# Patient Record
Sex: FEMALE | Race: WHITE | NOT HISPANIC OR LATINO | Employment: FULL TIME | URBAN - METROPOLITAN AREA
[De-identification: names, ages, dates, MRNs, and addresses within clinical notes are randomized per-mention and may not be internally consistent; named-entity substitution may affect disease eponyms.]

---

## 2017-07-07 ENCOUNTER — COMMUNICATION - HEALTHEAST (OUTPATIENT)
Dept: FAMILY MEDICINE | Facility: CLINIC | Age: 47
End: 2017-07-07

## 2017-07-07 DIAGNOSIS — E03.9 HYPOTHYROIDISM: ICD-10-CM

## 2017-09-18 ENCOUNTER — RECORDS - HEALTHEAST (OUTPATIENT)
Dept: ADMINISTRATIVE | Facility: OTHER | Age: 47
End: 2017-09-18

## 2017-09-18 ENCOUNTER — OFFICE VISIT - HEALTHEAST (OUTPATIENT)
Dept: FAMILY MEDICINE | Facility: CLINIC | Age: 47
End: 2017-09-18

## 2017-09-18 DIAGNOSIS — Z23 NEED FOR PROPHYLACTIC VACCINATION AND INOCULATION AGAINST VIRAL HEPATITIS: ICD-10-CM

## 2017-09-18 DIAGNOSIS — K21.9 GERD WITHOUT ESOPHAGITIS: ICD-10-CM

## 2017-09-18 DIAGNOSIS — Z12.4 SCREENING FOR CERVICAL CANCER: ICD-10-CM

## 2017-09-18 DIAGNOSIS — E55.9 VITAMIN D INSUFFICIENCY: ICD-10-CM

## 2017-09-18 DIAGNOSIS — Z00.00 WELL ADULT EXAM: ICD-10-CM

## 2017-09-18 DIAGNOSIS — E03.9 HYPOTHYROIDISM: ICD-10-CM

## 2017-09-18 DIAGNOSIS — Z13.1 SCREENING FOR DIABETES MELLITUS: ICD-10-CM

## 2017-09-18 LAB
CHOLEST SERPL-MCNC: 172 MG/DL
FASTING STATUS PATIENT QL REPORTED: YES
HDLC SERPL-MCNC: 44 MG/DL
LDLC SERPL CALC-MCNC: 98 MG/DL
TRIGL SERPL-MCNC: 150 MG/DL

## 2017-09-18 ASSESSMENT — MIFFLIN-ST. JEOR: SCORE: 1673.6

## 2017-09-19 ENCOUNTER — COMMUNICATION - HEALTHEAST (OUTPATIENT)
Dept: FAMILY MEDICINE | Facility: CLINIC | Age: 47
End: 2017-09-19

## 2017-09-21 LAB
HPV INTERPRETATION - HISTORICAL: NORMAL
HPV INTERPRETER - HISTORICAL: NORMAL

## 2017-10-03 ENCOUNTER — COMMUNICATION - HEALTHEAST (OUTPATIENT)
Dept: FAMILY MEDICINE | Facility: CLINIC | Age: 47
End: 2017-10-03

## 2017-10-03 DIAGNOSIS — E55.9 VITAMIN D INSUFFICIENCY: ICD-10-CM

## 2018-03-15 ENCOUNTER — AMBULATORY - HEALTHEAST (OUTPATIENT)
Dept: FAMILY MEDICINE | Facility: CLINIC | Age: 48
End: 2018-03-15

## 2018-03-19 ENCOUNTER — AMBULATORY - HEALTHEAST (OUTPATIENT)
Dept: NURSING | Facility: CLINIC | Age: 48
End: 2018-03-19

## 2018-12-07 ENCOUNTER — AMBULATORY - HEALTHEAST (OUTPATIENT)
Dept: FAMILY MEDICINE | Facility: CLINIC | Age: 48
End: 2018-12-07

## 2018-12-07 ENCOUNTER — OFFICE VISIT - HEALTHEAST (OUTPATIENT)
Dept: FAMILY MEDICINE | Facility: CLINIC | Age: 48
End: 2018-12-07

## 2018-12-07 ENCOUNTER — RECORDS - HEALTHEAST (OUTPATIENT)
Dept: ADMINISTRATIVE | Facility: OTHER | Age: 48
End: 2018-12-07

## 2018-12-07 DIAGNOSIS — Z83.3 FAMILY HISTORY OF DIABETES MELLITUS: ICD-10-CM

## 2018-12-07 DIAGNOSIS — Z00.00 ANNUAL PHYSICAL EXAM: ICD-10-CM

## 2018-12-07 DIAGNOSIS — E03.9 HYPOTHYROIDISM: ICD-10-CM

## 2018-12-07 DIAGNOSIS — I10 ESSENTIAL HYPERTENSION, BENIGN: ICD-10-CM

## 2018-12-07 LAB
ANION GAP SERPL CALCULATED.3IONS-SCNC: 13 MMOL/L (ref 5–18)
BUN SERPL-MCNC: 22 MG/DL (ref 8–22)
CALCIUM SERPL-MCNC: 9.9 MG/DL (ref 8.5–10.5)
CHLORIDE BLD-SCNC: 103 MMOL/L (ref 98–107)
CHOLEST SERPL-MCNC: 174 MG/DL
CO2 SERPL-SCNC: 25 MMOL/L (ref 22–31)
CREAT SERPL-MCNC: 0.79 MG/DL (ref 0.6–1.1)
FASTING STATUS PATIENT QL REPORTED: YES
GFR SERPL CREATININE-BSD FRML MDRD: >60 ML/MIN/1.73M2
GLUCOSE BLD-MCNC: 105 MG/DL (ref 70–125)
HBA1C MFR BLD: 5.9 % (ref 3.5–6)
HDLC SERPL-MCNC: 44 MG/DL
HGB BLD-MCNC: 13.2 G/DL (ref 12–16)
LDLC SERPL CALC-MCNC: 103 MG/DL
POTASSIUM BLD-SCNC: 4.1 MMOL/L (ref 3.5–5)
SODIUM SERPL-SCNC: 141 MMOL/L (ref 136–145)
TRIGL SERPL-MCNC: 137 MG/DL
TSH SERPL DL<=0.005 MIU/L-ACNC: 2.41 UIU/ML (ref 0.3–5)

## 2018-12-07 ASSESSMENT — MIFFLIN-ST. JEOR: SCORE: 1671.9

## 2018-12-10 LAB — 25(OH)D3 SERPL-MCNC: 22.2 NG/ML (ref 30–80)

## 2018-12-11 ENCOUNTER — COMMUNICATION - HEALTHEAST (OUTPATIENT)
Dept: FAMILY MEDICINE | Facility: CLINIC | Age: 48
End: 2018-12-11

## 2018-12-21 ENCOUNTER — AMBULATORY - HEALTHEAST (OUTPATIENT)
Dept: FAMILY MEDICINE | Facility: CLINIC | Age: 48
End: 2018-12-21

## 2018-12-21 ENCOUNTER — AMBULATORY - HEALTHEAST (OUTPATIENT)
Dept: NURSING | Facility: CLINIC | Age: 48
End: 2018-12-21

## 2018-12-21 DIAGNOSIS — I10 ESSENTIAL HYPERTENSION, BENIGN: ICD-10-CM

## 2018-12-21 DIAGNOSIS — E55.9 VITAMIN D INSUFFICIENCY: ICD-10-CM

## 2018-12-24 ENCOUNTER — COMMUNICATION - HEALTHEAST (OUTPATIENT)
Dept: FAMILY MEDICINE | Facility: CLINIC | Age: 48
End: 2018-12-24

## 2019-07-05 ENCOUNTER — OFFICE VISIT - HEALTHEAST (OUTPATIENT)
Dept: FAMILY MEDICINE | Facility: CLINIC | Age: 49
End: 2019-07-05

## 2019-07-05 DIAGNOSIS — E03.9 HYPOTHYROIDISM: ICD-10-CM

## 2019-07-05 DIAGNOSIS — I10 ESSENTIAL HYPERTENSION, BENIGN: ICD-10-CM

## 2019-07-05 DIAGNOSIS — L82.0 INFLAMED SEBORRHEIC KERATOSIS: ICD-10-CM

## 2019-07-05 LAB
ANION GAP SERPL CALCULATED.3IONS-SCNC: 8 MMOL/L (ref 5–18)
BUN SERPL-MCNC: 17 MG/DL (ref 8–22)
CALCIUM SERPL-MCNC: 9.7 MG/DL (ref 8.5–10.5)
CHLORIDE BLD-SCNC: 107 MMOL/L (ref 98–107)
CO2 SERPL-SCNC: 27 MMOL/L (ref 22–31)
CREAT SERPL-MCNC: 0.88 MG/DL (ref 0.6–1.1)
GFR SERPL CREATININE-BSD FRML MDRD: >60 ML/MIN/1.73M2
GLUCOSE BLD-MCNC: 101 MG/DL (ref 70–125)
POTASSIUM BLD-SCNC: 4.4 MMOL/L (ref 3.5–5)
SODIUM SERPL-SCNC: 142 MMOL/L (ref 136–145)

## 2019-07-06 ENCOUNTER — COMMUNICATION - HEALTHEAST (OUTPATIENT)
Dept: FAMILY MEDICINE | Facility: CLINIC | Age: 49
End: 2019-07-06

## 2019-09-19 ENCOUNTER — RECORDS - HEALTHEAST (OUTPATIENT)
Dept: ADMINISTRATIVE | Facility: OTHER | Age: 49
End: 2019-09-19

## 2020-03-09 ENCOUNTER — COMMUNICATION - HEALTHEAST (OUTPATIENT)
Dept: FAMILY MEDICINE | Facility: CLINIC | Age: 50
End: 2020-03-09

## 2020-03-09 DIAGNOSIS — L83 ACQUIRED ACANTHOSIS NIGRICANS: ICD-10-CM

## 2020-06-30 ENCOUNTER — COMMUNICATION - HEALTHEAST (OUTPATIENT)
Dept: FAMILY MEDICINE | Facility: CLINIC | Age: 50
End: 2020-06-30

## 2020-06-30 DIAGNOSIS — E03.9 HYPOTHYROIDISM: ICD-10-CM

## 2020-06-30 DIAGNOSIS — I10 ESSENTIAL HYPERTENSION, BENIGN: ICD-10-CM

## 2020-09-25 ENCOUNTER — OFFICE VISIT - HEALTHEAST (OUTPATIENT)
Dept: FAMILY MEDICINE | Facility: CLINIC | Age: 50
End: 2020-09-25

## 2020-09-25 ENCOUNTER — RECORDS - HEALTHEAST (OUTPATIENT)
Dept: ADMINISTRATIVE | Facility: OTHER | Age: 50
End: 2020-09-25

## 2020-09-25 DIAGNOSIS — R73.03 PREDIABETES: ICD-10-CM

## 2020-09-25 DIAGNOSIS — I10 ESSENTIAL HYPERTENSION, BENIGN: ICD-10-CM

## 2020-09-25 DIAGNOSIS — Z76.89 ESTABLISHING CARE WITH NEW DOCTOR, ENCOUNTER FOR: ICD-10-CM

## 2020-09-25 DIAGNOSIS — Z12.31 VISIT FOR SCREENING MAMMOGRAM: ICD-10-CM

## 2020-09-25 DIAGNOSIS — Z12.11 COLON CANCER SCREENING: ICD-10-CM

## 2020-09-25 DIAGNOSIS — E03.9 HYPOTHYROIDISM, UNSPECIFIED TYPE: ICD-10-CM

## 2020-09-25 DIAGNOSIS — Z23 NEED FOR VACCINATION: ICD-10-CM

## 2020-09-25 LAB
ANION GAP SERPL CALCULATED.3IONS-SCNC: 8 MMOL/L (ref 5–18)
BUN SERPL-MCNC: 25 MG/DL (ref 8–22)
CALCIUM SERPL-MCNC: 10.1 MG/DL (ref 8.5–10.5)
CHLORIDE BLD-SCNC: 106 MMOL/L (ref 98–107)
CHOLEST SERPL-MCNC: 186 MG/DL
CO2 SERPL-SCNC: 27 MMOL/L (ref 22–31)
CREAT SERPL-MCNC: 0.81 MG/DL (ref 0.6–1.1)
FASTING STATUS PATIENT QL REPORTED: YES
GFR SERPL CREATININE-BSD FRML MDRD: >60 ML/MIN/1.73M2
GLUCOSE BLD-MCNC: 120 MG/DL (ref 70–125)
HBA1C MFR BLD: 5.9 %
HDLC SERPL-MCNC: 46 MG/DL
LDLC SERPL CALC-MCNC: 114 MG/DL
POTASSIUM BLD-SCNC: 4.2 MMOL/L (ref 3.5–5)
SODIUM SERPL-SCNC: 141 MMOL/L (ref 136–145)
TRIGL SERPL-MCNC: 130 MG/DL
TSH SERPL DL<=0.005 MIU/L-ACNC: 2.02 UIU/ML (ref 0.3–5)

## 2020-09-25 ASSESSMENT — MIFFLIN-ST. JEOR: SCORE: 1690.05

## 2020-09-28 ENCOUNTER — COMMUNICATION - HEALTHEAST (OUTPATIENT)
Dept: FAMILY MEDICINE | Facility: CLINIC | Age: 50
End: 2020-09-28

## 2020-10-06 ENCOUNTER — COMMUNICATION - HEALTHEAST (OUTPATIENT)
Dept: FAMILY MEDICINE | Facility: CLINIC | Age: 50
End: 2020-10-06

## 2020-10-06 DIAGNOSIS — I10 ESSENTIAL HYPERTENSION, BENIGN: ICD-10-CM

## 2020-10-06 DIAGNOSIS — E03.9 HYPOTHYROIDISM: ICD-10-CM

## 2020-10-09 RX ORDER — LOSARTAN POTASSIUM 50 MG/1
TABLET ORAL
Qty: 90 TABLET | Refills: 3 | Status: SHIPPED | OUTPATIENT
Start: 2020-10-09 | End: 2021-12-31

## 2020-10-09 RX ORDER — LEVOTHYROXINE SODIUM 112 UG/1
TABLET ORAL
Qty: 90 TABLET | Refills: 3 | Status: SHIPPED | OUTPATIENT
Start: 2020-10-09 | End: 2021-12-31

## 2021-05-29 ENCOUNTER — RECORDS - HEALTHEAST (OUTPATIENT)
Dept: ADMINISTRATIVE | Facility: CLINIC | Age: 51
End: 2021-05-29

## 2021-05-30 NOTE — PROGRESS NOTES
ASSESSMENT/PLAN:       1. Hypothyroidism      - levothyroxine (SYNTHROID, LEVOTHROID) 112 MCG tablet; Take 1 tablet (112 mcg total) by mouth Daily at 6:00 am.  Dispense: 90 tablet; Refill: 3  Patient will return in 6 months for a chronic disease visit at that time will be due to check her TSH    2. Essential hypertension, benign    We will increase the losartan to 50 mg daily and will check her creatinine and electrolytes today.  She like to get a letter with the test results    - losartan (COZAAR) 50 MG tablet; Take 1 tablet (50 mg total) by mouth daily.  Dispense: 90 tablet; Refill: 3  - Basic Metabolic Panel    #3 seborrheic keratosis left ear  Expectations and wound care discussed with the patient.  It is possible that freezing this will not completely get rid of it but I explained to the patient that we need to be careful as to how aggressive we are with treatment because of the underlying cartilage and do not want to cause any deformity to the cartilage.  She is aware that this lesion is benign.        Chester Ravi MD      PROGRESS NOTE   7/5/2019    SUBJECTIVE:  Marcus Fu is a 49 y.o. female  who presents for   Chief Complaint   Patient presents with     Follow-up     BP recheck, lump on left ear     Breast Mass     lump on left breast     1. Hypothyroidism  The patient has had a history of hypothyroidism for for 5 years and is been on a fairly stable dose of levothyroxine.  Her last TSH was December 2018 and the value was normal.  She needs a refill on her levothyroxine    - levothyroxine (SYNTHROID, LEVOTHROID) 112 MCG tablet; Take 1 tablet (112 mcg total) by mouth Daily at 6:00 am.  Dispense: 90 tablet; Refill: 3    2. Essential hypertension, benign  The patient has had a history of hypertension for the last 6 or 7 months and was placed on losartan 25 mg daily in December 2018 and has not had any side effects from that medication.  She really has not been monitoring her blood pressure and she  has not had any follow-up blood work.  The blood pressure is elevated today    #3 seborrheic keratosis left ear  Patient has an irritating lesion on the pinna of the left ear that is been there for about a year and at one point she picked it off but it grew back.  She like to have that removed if possible  Patient Active Problem List   Diagnosis     Obesity     Hypothyroidism     Female Stress Incontinence     Acrochordon     Vitamin D insufficiency     Prediabetes     Essential hypertension, benign       Current Outpatient Medications   Medication Sig Dispense Refill     cholecalciferol, vitamin D3, (VITAMIN D3) 2,000 unit Tab Take 1 tablet (2,000 Units total) by mouth daily. 1 tablet 0     levothyroxine (SYNTHROID, LEVOTHROID) 112 MCG tablet Take 1 tablet (112 mcg total) by mouth Daily at 6:00 am. 90 tablet 3     losartan (COZAAR) 50 MG tablet Take 1 tablet (50 mg total) by mouth daily. 90 tablet 3     omeprazole (PRILOSEC) 20 MG capsule Take 1 capsule (20 mg total) by mouth daily before breakfast. 30 capsule 2     No current facility-administered medications for this visit.        Social History     Tobacco Use   Smoking Status Never Smoker   Smokeless Tobacco Never Used           OBJECTIVE:        Recent Results (from the past 240 hour(s))   Basic Metabolic Panel   Result Value Ref Range    Sodium 142 136 - 145 mmol/L    Potassium 4.4 3.5 - 5.0 mmol/L    Chloride 107 98 - 107 mmol/L    CO2 27 22 - 31 mmol/L    Anion Gap, Calculation 8 5 - 18 mmol/L    Glucose 101 70 - 125 mg/dL    Calcium 9.7 8.5 - 10.5 mg/dL    BUN 17 8 - 22 mg/dL    Creatinine 0.88 0.60 - 1.10 mg/dL    GFR MDRD Af Amer >60 >60 mL/min/1.73m2    GFR MDRD Non Af Amer >60 >60 mL/min/1.73m2       Vitals:    07/05/19 0901 07/05/19 0903   BP: 131/90 125/90   Pulse: 85    SpO2: 98%    Weight: (!) 227 lb 12.8 oz (103.3 kg)      Weight: (!) 227 lb 12.8 oz (103.3 kg)          Physical Exam:  GENERAL APPEARANCE: 49-year-old female, NAD, well hydrated,  well nourished  SKIN:  Normal skin turgor, left pinna there is a 3 mm seborrheic keratosis that is mildly irritated and it was treated with liquid nitrogen x2 with 1/22 freeze each time  HEENT: moist mucous membranes, no rhinorrhea  NECK: Normal without adenopathy or masses  CV: RRR, no M/G/R   LUNGS: CTAB  ABDOMEN: S&NT, no masses or enlarged organs   EXTREMITY: no edema and full ROM of all joints  NEURO: no focal findings

## 2021-05-31 VITALS — BODY MASS INDEX: 34.01 KG/M2 | WEIGHT: 224.44 LBS | HEIGHT: 68 IN

## 2021-06-02 VITALS — WEIGHT: 222.31 LBS | BODY MASS INDEX: 33.69 KG/M2 | HEIGHT: 68 IN

## 2021-06-03 VITALS — WEIGHT: 227.8 LBS | BODY MASS INDEX: 34.64 KG/M2

## 2021-06-05 VITALS
RESPIRATION RATE: 18 BRPM | OXYGEN SATURATION: 97 % | HEIGHT: 68 IN | BODY MASS INDEX: 34.3 KG/M2 | TEMPERATURE: 98.4 F | DIASTOLIC BLOOD PRESSURE: 86 MMHG | SYSTOLIC BLOOD PRESSURE: 132 MMHG | HEART RATE: 107 BPM | WEIGHT: 226.31 LBS

## 2021-06-06 NOTE — TELEPHONE ENCOUNTER
Discussion with Dr. 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  The hyperpigmentation is painful and the patient also has a type of hand dermatitis which can be associated with adenocarcinoma.  I am happy to see the patient for a annual wellness exam.  Tests that are needed: Evaluation for metabolic syndrome.  Screening for adenocarcinoma and lymphoma.  Reasonable would include mammogram, colonoscopy, CBC, sed rate, LDH, hepatic profile, urinalysis, CT of the abdomen and pelvis and also possibly upper endoscopy.  The patient will make an appointment in the near future to have this examination and at that time I will order appropriate testing.   1-2 drinks

## 2021-06-06 NOTE — TELEPHONE ENCOUNTER
Provider Communication  Who is calling:  Dr Gely Sweet  Facility in which provider is associated:  [facility]  Reason for call:  Wants to speak to provider regarding this patients malignancy.  Urgency for return call:  within 2 hours  Okay to leave detailed message?:  Yes

## 2021-06-09 NOTE — TELEPHONE ENCOUNTER
RN cannot approve Refill Request    RN can NOT refill this medication Protocol failed and NO refill given.        Josiane Zaidi, [facility] Triage/Med Refill 7/1/2020    Requested Prescriptions   Pending Prescriptions Disp Refills     levothyroxine (SYNTHROID, LEVOTHROID) 112 MCG tablet [Pharmacy Med Name: LEVOTHYROXINE SOD 112MCG TAB] 90 tablet 3     Sig: TAKE ONE TABLET BY MOUTH EVERY DAY AT 6:00AM       Thyroid Hormones Protocol Failed - 6/30/2020  4:16 AM        Failed - TSH on file in past 12 months for patient age 12 & older     TSH   Date Value Ref Range Status   12/07/2018 2.41 0.30 - 5.00 uIU/mL Final                   Passed - Provider visit in past 12 months or next 3 months     Last office visit with prescriber/PCP: 7/5/2019 Chester Ravi MD OR same dept: 7/5/2019 Chester Ravi MD OR same specialty: 7/5/2019 Chester Ravi MD  Last physical: Visit date not found Last MTM visit: Visit date not found   Next visit within 3 mo: Visit date not found  Next physical within 3 mo: Visit date not found  Prescriber OR PCP: Chester Ravi MD  Last diagnosis associated with med order: 1. Hypothyroidism  - levothyroxine (SYNTHROID, LEVOTHROID) 112 MCG tablet [Pharmacy Med Name: LEVOTHYROXINE SOD 112MCG TAB]; TAKE ONE TABLET BY MOUTH EVERY DAY AT 6:00AM  Dispense: 90 tablet; Refill: 3    2. Essential hypertension, benign  - losartan (COZAAR) 50 MG tablet [Pharmacy Med Name: LOSARTAN POTASSIUM 50MG TABS]; TAKE ONE TABLET BY MOUTH EVERY DAY  Dispense: 90 tablet; Refill: 3    If protocol passes may refill for 12 months if within 3 months of last provider visit (or a total of 15 months).                losartan (COZAAR) 50 MG tablet [Pharmacy Med Name: LOSARTAN POTASSIUM 50MG TABS] 90 tablet 3     Sig: TAKE ONE TABLET BY MOUTH EVERY DAY       Angiotensin Receptor Blocker Protocol Passed - 6/30/2020  4:16 AM        Passed - PCP or prescribing provider visit in past 12 months       Last office visit with  prescriber/PCP: 7/5/2019 Chester Ravi MD OR same dept: 7/5/2019 Chester Ravi MD OR same specialty: 7/5/2019 Chester Ravi MD  Last physical: Visit date not found Last MTM visit: Visit date not found   Next visit within 3 mo: Visit date not found  Next physical within 3 mo: Visit date not found  Prescriber OR PCP: Chester Ravi MD  Last diagnosis associated with med order: 1. Hypothyroidism  - levothyroxine (SYNTHROID, LEVOTHROID) 112 MCG tablet [Pharmacy Med Name: LEVOTHYROXINE SOD 112MCG TAB]; TAKE ONE TABLET BY MOUTH EVERY DAY AT 6:00AM  Dispense: 90 tablet; Refill: 3    2. Essential hypertension, benign  - losartan (COZAAR) 50 MG tablet [Pharmacy Med Name: LOSARTAN POTASSIUM 50MG TABS]; TAKE ONE TABLET BY MOUTH EVERY DAY  Dispense: 90 tablet; Refill: 3    If protocol passes may refill for 12 months if within 3 months of last provider visit (or a total of 15 months).             Passed - Serum potassium within the past 12 months     Lab Results   Component Value Date    Potassium 4.4 07/05/2019             Passed - Blood pressure filed in past 12 months     BP Readings from Last 1 Encounters:   07/05/19 (!) 136/92             Passed - Serum creatinine within the past 12 months     Creatinine   Date Value Ref Range Status   07/05/2019 0.88 0.60 - 1.10 mg/dL Final

## 2021-06-09 NOTE — TELEPHONE ENCOUNTER
Please help the patient get an appointment to see me in the clinic for AWV with care gaps in July.  Only refill times 1.    Chester Ravi MD

## 2021-06-11 NOTE — PROGRESS NOTES
1. Essential hypertension, benign  Basic Metabolic Panel    Lipid Cascade FASTING   2. Hypothyroidism, unspecified type  Thyroid Stimulating Hormone (TSH)   3. Prediabetes  Glycosylated Hemoglobin A1c   4. Visit for screening mammogram  CANCELED: Mammo Screening Bilateral   5. Colon cancer screening  CANCELED: Ambulatory referral for Colonoscopy   6. Need for vaccination     7. Establishing care with new doctor, encounter for           ASSESSMENT/PLAN:     The following high BMI interventions were performed this visit: encouragement to exercise, weight monitoring, exercise promotion: stretching and exercise promotion: walking/step counting    1. Essential hypertension, benign    - Basic Metabolic Panel  - Lipid Cascade FASTING  Stable on current medications, no changes made  The 10-year ASCVD risk score (Geignacia RODRIGES Jr., et al., 2013) is: 2%    Values used to calculate the score:      Age: 50 years      Sex: Female      Is Non- : No      Diabetic: No      Tobacco smoker: No      Systolic Blood Pressure: 132 mmHg      Is BP treated: Yes      HDL Cholesterol: 46 mg/dL      Total Cholesterol: 186 mg/dL      2. Hypothyroidism, unspecified type    - Thyroid Stimulating Hormone (TSH)  Stable per recent lab work    3. Prediabetes    - Glycosylated Hemoglobin A1c  Currently not on medications, A1c 5.9  Continue with exercise, diet modifications and weight loss     4. Visit for screening mammogram    Monthly BSE  Has appointment for her mammogram today at [facility] in [place]    5. Colon cancer screening     Patient would like to hold off for now due to COVID pandemic    6. Need for vaccination    Already received flu vaccine  Discussed Shingrix vaccine series today    7. Establishing care with new doctor, encounter for    Scheduled annual physical in the next 6 months    There are no Patient Instructions on file for this visit.  There are no discontinued medications.  Return in about 6 months (around  "3/25/2021) for annual physical, hypertension. Med check by phone okay, physical when conveinent for patient.    The visit lasted a total of 40 minutes face to face with the patient.  Over 50% of the time spent counseling and educating the patient about medical, family, surgical history, preventative health, med check, vaccines.     Elizabeth Sandhu NP          SUBJECTIVE:  Marcus Fu is a 50 y.o. female presents to [facility] care today and for her medication check.  Medical, family and surgical history reviewed.  Patient is , she has 1 child.  She works full-time doing office work.  She does not drink alcohol, she is a non-smoker and denies illicit drug use.  She is not on any special diet is 9, she does not exercise.  Current BMI 34.4.  Her last menstrual cycle was in March 2020.  No history of abortions or miscarriages.  Last Pap smear completed in 2017 which was negative for ASCUS or HPV.  She is scheduled to have her mammogram completed today.  She would like to hold off on her colon cancer screening and bone scan due to the COVID pandemic.  She has her mammograms completed at [facility] in [place].  She has already received her influenza vaccine this season.  She is due for shingles vaccine and her annual physical.     Hypertension: Stable on current dose of losartan.  She has no history of coronary artery disease or stroke.  Denies any chest pain, shortness of breath, headaches or lower extremity swelling today.  She is not taking a statin at this time and does not take aspirin.  She did complete a stress test at [facility] back in 2013 which was negative along with her EKG.     Hypothyroid: Denies any recent fatigue changes, hair loss, dry skin or constipation.  She is pre-diabetic, last A1c check was 5.9.    GERD: Well-controlled on current Prilosec dose.  She does have a history of chronic IBS but she states \"I just deal with it \".    Additional medical history includes changes in " "pigmentation of the skin, she did see dermatology, these areas were negative for cancer.  She does get some blurry vision with small print, she is overdue for an eye exam, she states \"I am not embracing cheaters \".  She does have a history of seasonal allergies and does experience hoarseness with this.  For the last 2 weeks, she has been dealing with some neck stiffness, she has not noticed any significant change with range of motion.  She has not used any ice or heat, she has been taking Aleve with minimal relief.  She denies any falls or trauma.  She has been dealing with left heel plantar fasciitis pain for the last 6 weeks now.  She has not been seen by podiatry for this.  She  does not need inserts in her shoes or tried icing the heels.  She does have a history of stress incontinence.  She did complete a consult with urology specialty back in September 2019.  She does wear a very thin pad for her incontinence issues.  She is not sexually active because of this issue, she is rather embarrassed by it.  She has a history of having one abnormal mammogram, her follow-up focal ultrasounds were negative for malignancy.  She does not perform breast self exams.  Chief Complaint   Patient presents with     Establish Care         Patient Active Problem List   Diagnosis     Obesity     Hypothyroidism     Female Stress Incontinence     Acrochordon     Vitamin D insufficiency     Prediabetes     Essential hypertension, benign     Acquired acanthosis nigricans       Current Outpatient Medications   Medication Sig Dispense Refill     levothyroxine (SYNTHROID, LEVOTHROID) 112 MCG tablet TAKE ONE TABLET BY MOUTH EVERY DAY AT 6:00AM 90 tablet 0     losartan (COZAAR) 50 MG tablet TAKE ONE TABLET BY MOUTH EVERY DAY 90 tablet 0     omeprazole (PRILOSEC) 20 MG capsule Take 1 capsule (20 mg total) by mouth daily before breakfast. 30 capsule 2     cholecalciferol, vitamin D3, (VITAMIN D3) 2,000 unit Tab Take 1 tablet (2,000 Units " total) by mouth daily. 1 tablet 0     No current facility-administered medications for this visit.        Social History     Tobacco Use   Smoking Status Never Smoker   Smokeless Tobacco Never Used       REVIEW OF SYSTEMS: Denies radiation, diaphoresis, shortness of breath, dizziness, syncope, nausea, palpitations, and associated with activity.       TOBACCO USE:  Social History     Tobacco Use   Smoking Status Never Smoker   Smokeless Tobacco Never Used     Social History     Socioeconomic History     Marital status:      Spouse name: Estiven     Number of children: 1     Years of education: 14     Highest education level: Not on file   Occupational History     Occupation: purchasing   Social Needs     Financial resource strain: Not on file     Food insecurity     Worry: Not on file     Inability: Not on file     Transportation needs     Medical: Not on file     Non-medical: Not on file   Tobacco Use     Smoking status: Never Smoker     Smokeless tobacco: Never Used   Substance and Sexual Activity     Alcohol use: No     Drug use: No     Sexual activity: Yes     Partners: Male     Birth control/protection: Condom   Lifestyle     Physical activity     Days per week: Not on file     Minutes per session: Not on file     Stress: Not on file   Relationships     Social connections     Talks on phone: Not on file     Gets together: Not on file     Attends [employer] service: Not on file     Active member of club or organization: Not on file     Attends meetings of clubs or organizations: Not on file     Relationship status: Not on file     Intimate partner violence     Fear of current or ex partner: Not on file     Emotionally abused: Not on file     Physically abused: Not on file     Forced sexual activity: Not on file   Other Topics Concern     Not on file   Social History Narrative     Not on file         OBJECTIVE:            Vitals:    09/25/20 0655   BP: 132/86   Pulse: (!) 107   Resp: 18   Temp: 98.4  F (36.9  C)    SpO2: 97%     Weight: (!) 226 lb 5 oz (102.7 kg)    Wt Readings from Last 3 Encounters:   09/25/20 (!) 226 lb 5 oz (102.7 kg)   07/05/19 (!) 227 lb 12.8 oz (103.3 kg)   12/07/18 (!) 222 lb 5 oz (100.8 kg)     Body mass index is 34.41 kg/m .        Physical Exam:  GENERAL APPEARANCE: A&A, NAD, well hydrated, well nourished  HEAD: atraumatic, no deformity  EYES: PERRL, EOM's intact, no redness or swelling  NECK: Supple, without lymphadenopathy, no thyroid mass, no JVD, no bruit, large knot noted on right side of neck, patient has full ROM  CV: RRR, no M/G/R, no LE swelling   LUNGS: CTAB, normal respiratory effort  ABDOMEN: S&NT, no masses, no organomegaly, BS present x4   SKIN:  Normal skin turgor, no lesions/rashes, warm and dry   NEURO: no gross deficits

## 2021-06-12 NOTE — TELEPHONE ENCOUNTER
Refill Approved    Rx renewed per Medication Renewal Policy. Medication was last renewed on 7/1/20.    Josiane Zaidi, [facility] Triage/Med Refill 10/9/2020     Requested Prescriptions   Pending Prescriptions Disp Refills     levothyroxine (SYNTHROID, LEVOTHROID) 112 MCG tablet [Pharmacy Med Name: LEVOTHYROXINE SOD 112MCG TAB] 90 tablet 0     Sig: TAKE ONE TABLET BY MOUTH EVERY DAY AT 6:00AM       Thyroid Hormones Protocol Passed - 10/6/2020  6:57 PM        Passed - Provider visit in past 12 months or next 3 months     Last office visit with prescriber/PCP: 7/5/2019 Chester Ravi MD OR same dept: 9/25/2020 Elizabeth Sandhu NP OR same specialty: 9/25/2020 Elizabeth Sandhu NP  Last physical: Visit date not found Last MTM visit: Visit date not found   Next visit within 3 mo: Visit date not found  Next physical within 3 mo: Visit date not found  Prescriber OR PCP: Chester Ravi MD  Last diagnosis associated with med order: 1. Hypothyroidism  - levothyroxine (SYNTHROID, LEVOTHROID) 112 MCG tablet [Pharmacy Med Name: LEVOTHYROXINE SOD 112MCG TAB]; TAKE ONE TABLET BY MOUTH EVERY DAY AT 6:00AM  Dispense: 90 tablet; Refill: 0    2. Essential hypertension, benign  - losartan (COZAAR) 50 MG tablet [Pharmacy Med Name: LOSARTAN POTASSIUM 50MG TABS]; TAKE ONE TABLET BY MOUTH EVERY DAY  Dispense: 90 tablet; Refill: 0    If protocol passes may refill for 12 months if within 3 months of last provider visit (or a total of 15 months).             Passed - TSH on file in past 12 months for patient age 12 & older     TSH   Date Value Ref Range Status   09/25/2020 2.02 0.30 - 5.00 uIU/mL Final                      losartan (COZAAR) 50 MG tablet [Pharmacy Med Name: LOSARTAN POTASSIUM 50MG TABS] 90 tablet 0     Sig: TAKE ONE TABLET BY MOUTH EVERY DAY       Angiotensin Receptor Blocker Protocol Passed - 10/6/2020  6:57 PM        Passed - PCP or prescribing provider visit in past 12 months       Last office visit with  prescriber/PCP: 7/5/2019 Chester Ravi MD OR same dept: 9/25/2020 Elizabeth Sandhu NP OR same specialty: 9/25/2020 Elizabeth Sandhu NP  Last physical: Visit date not found Last MTM visit: Visit date not found   Next visit within 3 mo: Visit date not found  Next physical within 3 mo: Visit date not found  Prescriber OR PCP: Chester Ravi MD  Last diagnosis associated with med order: 1. Hypothyroidism  - levothyroxine (SYNTHROID, LEVOTHROID) 112 MCG tablet [Pharmacy Med Name: LEVOTHYROXINE SOD 112MCG TAB]; TAKE ONE TABLET BY MOUTH EVERY DAY AT 6:00AM  Dispense: 90 tablet; Refill: 0    2. Essential hypertension, benign  - losartan (COZAAR) 50 MG tablet [Pharmacy Med Name: LOSARTAN POTASSIUM 50MG TABS]; TAKE ONE TABLET BY MOUTH EVERY DAY  Dispense: 90 tablet; Refill: 0    If protocol passes may refill for 12 months if within 3 months of last provider visit (or a total of 15 months).             Passed - Serum potassium within the past 12 months     Lab Results   Component Value Date    Potassium 4.2 09/25/2020             Passed - Blood pressure filed in past 12 months     BP Readings from Last 1 Encounters:   09/25/20 132/86             Passed - Serum creatinine within the past 12 months     Creatinine   Date Value Ref Range Status   09/25/2020 0.81 0.60 - 1.10 mg/dL Final

## 2021-06-13 NOTE — PROGRESS NOTES
Assessment:      Healthy female exam.    1. Well adult exam  Lipid Cascade   2. Need for prophylactic vaccination and inoculation against viral hepatitis  Hepatitis A vaccine adult IM   3. Hypothyroidism  Thyroid Stimulating Hormone (TSH)   4. Screening for diabetes mellitus  Glucose          Plan:       Check Pap smear and lab as above.   She can get second hepatitis A vaccine in 6 months-nurse visit only.  Discussed approach to healthy diet and exercise for pursuing gradual weight reduction with goal of losing 2-4 pounds per month.  Refill on her thyroid medication for the next year.  Refill on omeprazole as needed.    Subjective:      Marcus Fu is a 47 y.o. female who presents for an annual exam. The patient is sexually active. The patient participates in regular exercise: yes. The patient reports that there is not domestic violence in her life. Generally doing well.  She takes omeprazole 45 days out of the month for heartburn type and reflux type difficulty.    Healthy Habits:   Regular Exercise: Yes  Sunscreen Use: Yes  Healthy Diet: No  Dental Visits Regularly: Yes  Seat Belt: Yes  Sexually active: Yes  Self Breast Exam Monthly:No  Hemoccults: No  Flex Sig: Yes  Colonoscopy: No  Lipid Profile: Yes  Glucose Screen: Yes  Prevention of Osteoporosis: No  Last Dexa: No  Guns at Home:  No      Immunization History   Administered Date(s) Administered     DT (pediatric) 09/01/1998     Td, historic 09/01/1998     Tdap 10/24/2011     Immunization status: up to date and documented.    No exam data present    Gynecologic History  Patient's last menstrual period was 08/28/2017.  Contraception: condoms  Last Pap: 2/19/2014. Results were: normal  Last mammogram: 8/20/2016. Results were: normal      OB History   No data available       Current Outpatient Prescriptions   Medication Sig Dispense Refill     ergocalciferol (ERGOCALCIFEROL) 50,000 unit capsule Take 1 capsule (50,000 Units total) by mouth once a week. 4  "capsule 12     levothyroxine (SYNTHROID, LEVOTHROID) 112 MCG tablet TAKE 1 TABLET BY MOUTH DAILY. 90 tablet 0     omeprazole (PRILOSEC) 20 MG capsule TAKE ONE CAPSULE BY MOUTH ONE TIME DAILY  90 capsule 1     No current facility-administered medications for this visit.      Past Medical History:   Diagnosis Date     Diverticulitis large intestine      Gall bladder disease      Previous  section      S/P laparoscopic-assisted sigmoidectomy      Past Surgical History:   Procedure Laterality Date     BILATERAL TEMPOROMANDIBULAR JOINT ARTHROPLASTY       [place]  DELIVERY ONLY      Description:  Section;  Recorded: 2014;     [place] LAP,CHOLECYSTECTOMY      Description: Cholecystectomy Laparoscopic;  Recorded: 2014;     [place] PART REMOVAL COLON W ANASTOMOSIS      Description: Partial Colectomy - Sigmoid;  Proc Date: 2013;     Review of patient's allergies indicates no known allergies.  Family History   Problem Relation Age of Onset     Diabetes type II Mother      Cancer Maternal Grandmother      Diabetes type II Paternal Grandfather      Social History     Social History     Marital status:      Spouse name: Estiven     Number of children: 1     Years of education: 14     Occupational History     purchasing      Social History Main Topics     Smoking status: Never Smoker     Smokeless tobacco: Never Used     Alcohol use No     Drug use: No     Sexual activity: Yes     Partners: Male     Birth control/ protection: Condom     Other Topics Concern     Not on file     Social History Narrative       Review of Systems  Review of Systems   All other review of systems are negative.          Objective:         Vitals:    17 0951   BP: 154/78   Pulse: 92   Temp: 97.6  F (36.4  C)   TempSrc: Oral   Weight: (!) 224 lb 7 oz (101.8 kg)   Height: 5' 7.5\" (1.715 m)     Body mass index is 34.63 kg/(m^2).    Physical  Physical Exam   Head normocephalic.  External ears and TMs normal.  Eyes " unremarkable with pupils equal round and react to light and accommodation.  Nose and oropharynx negative.  She does have an area of diffuse brownish pigmentation at least 5-7 cm diameter in the right temple area that looks benign, she is previously asked dermatologist about it and wondered if there was anything that could remove that appearance.  Does not look like any malignancy.  There is irregular surface to the skin there that is diffuse.  Neck supple without adenopathy or thyromegaly.  Lungs clear.  Heart regular rate and rhythm without murmur.  Impression no masses tenderness or discharge.  Abdomen shows no masses tenderness or hepatosplenomegaly.  Pelvic examination shows normal external genitalia BUS and vagina normal cervix and ThinPrep Pap was done.  Bimanual exam negative and rectovaginal exam confirms.  Good peripheral pulses including dorsalis pedis pulses.  She is alert with clear speech.

## 2021-06-16 PROBLEM — R73.03 PREDIABETES: Status: ACTIVE | Noted: 2017-09-19

## 2021-06-16 PROBLEM — L83 ACQUIRED ACANTHOSIS NIGRICANS: Status: ACTIVE | Noted: 2020-03-10

## 2021-06-16 PROBLEM — I10 ESSENTIAL HYPERTENSION, BENIGN: Status: ACTIVE | Noted: 2018-12-07

## 2021-06-19 NOTE — LETTER
Letter by Chester Ravi MD at      Author: Chester Ravi MD Service: -- Author Type: --    Filed:  Encounter Date: 7/6/2019 Status: (Other)         Marcus Fu  [address] [facility] [address]             July 6, 2019         Dear Ms. Fu,    Below are the results from your recent visit:    Resulted Orders   Basic Metabolic Panel   Result Value Ref Range    Sodium 142 136 - 145 mmol/L    Potassium 4.4 3.5 - 5.0 mmol/L    Chloride 107 98 - 107 mmol/L    CO2 27 22 - 31 mmol/L    Anion Gap, Calculation 8 5 - 18 mmol/L    Glucose 101 70 - 125 mg/dL    Calcium 9.7 8.5 - 10.5 mg/dL    BUN 17 8 - 22 mg/dL    Creatinine 0.88 0.60 - 1.10 mg/dL    GFR MDRD Af Amer >60 >60 mL/min/1.73m2    GFR MDRD Non Af Amer >60 >60 mL/min/1.73m2    Narrative    Fasting Glucose reference range is 70-99 mg/dL per  American Diabetes Association (ADA) guidelines.       Merrily, it was a pleasure to have seen you in the clinic this week.  Your test results are above and I would like to go over these results for you.    The blood test that I did was a metabolic panel and I primarily was concerned about your electrolytes such as your potassium and also your kidney function which is measured by the creatinine. We like to check this when you are on losartan and the good news is that your electrolytes and kidney function look good.  No concerns.  Also your blood glucose was satisfactory at 101.    The plan would be to increase the losartan to 50 mg as we discussed and I would be happy to see you back in about 6 months for follow-up on your blood pressure and any other evaluation that is needed at that time.    I hope that keratosis on your ear goes away after the treatment.    Please call with questions or contact us using [employer].    Sincerely,        Electronically signed by Chester Ravi MD

## 2021-06-20 NOTE — LETTER
Letter by Elizabeth Sandhu NP at      Author: Elizabeth Sandhu NP Service: -- Author Type: --    Filed:  Encounter Date: 9/28/2020 Status: (Other)         Marcus Fu  [address] [place] [address]             September 28, 2020         Dear Ms. Fu,    Below are the results from your recent visit:    Resulted Orders   Basic Metabolic Panel   Result Value Ref Range    Sodium 141 136 - 145 mmol/L    Potassium 4.2 3.5 - 5.0 mmol/L    Chloride 106 98 - 107 mmol/L    CO2 27 22 - 31 mmol/L    Anion Gap, Calculation 8 5 - 18 mmol/L    Glucose 120 70 - 125 mg/dL    Calcium 10.1 8.5 - 10.5 mg/dL    BUN 25 (H) 8 - 22 mg/dL    Creatinine 0.81 0.60 - 1.10 mg/dL    GFR MDRD Af Amer >60 >60 mL/min/1.73m2    GFR MDRD Non Af Amer >60 >60 mL/min/1.73m2    Narrative    Fasting Glucose reference range is 70-99 mg/dL per  American Diabetes Association (ADA) guidelines.   Thyroid Stimulating Hormone (TSH)   Result Value Ref Range    TSH 2.02 0.30 - 5.00 uIU/mL   Lipid Cascade FASTING   Result Value Ref Range    Cholesterol 186 <=199 mg/dL    Triglycerides 130 <=149 mg/dL    HDL Cholesterol 46 (L) >=50 mg/dL    LDL Calculated 114 <=129 mg/dL    Patient Fasting > 8hrs? Yes    Glycosylated Hemoglobin A1c   Result Value Ref Range    Hemoglobin A1c 5.9 (H) <=5.6 %      Comment:      Normal <5.7% Prediabete 5.7-6.4% Diabletes 6.5% or higher - adopted from ADA consensus guidelines       Your A1c is holding steady at 5.9 so still considered pre-diabetic.   The 10-year ASCVD risk score ([place] RODRIGES Jr., et al., 2013) is: 2%    Values used to calculate the score:      Age: 50 years      Sex: Female      Is Non- : No      Diabetic: No      Tobacco smoker: No      Systolic Blood Pressure: 132 mmHg      Is BP treated: Yes      HDL Cholesterol: 46 mg/dL      Total Cholesterol: 186 mg/dL  Your risk score for stroke and heart attack is below the goal range of 7% so no concerns there.   Thyroid is  well controlled and your kidneys and electrolytes look great. :)    Please call with questions or contact us using [employer].    Sincerely,        Electronically signed by Elizabeth Sandhu NP

## 2021-06-22 NOTE — PROGRESS NOTES
I met with Marcus Fu at the request of Elizabeth Sandhu to recheck her blood pressure.  Blood pressure medications on the MAR were reviewed with patient.    Patient has taken all medications as per usual regimen: Yes  Patient reports tolerating them without any issues or concerns: Yes    Vitals:    12/21/18 1442   BP: 132/86       Blood pressure was taken, previous encounter was reviewed, patient was instructed to continue current plan..

## 2021-06-22 NOTE — PROGRESS NOTES
Marcus Fu is a 48 y.o. female is here for a  Health Maintenance exam.  Medical, family and surgical history reviewed.  Patient is , has 1 child.  She works as a .  She does not drink alcohol, is a non-smoker and denies illicit drug use.  She does not exercise regularly.  Medical history includes hypothyroidism, she is currently taking 112 MCG of levothyroxine.  She continues to experience some increase in thirst.  There is a family history of diabetes.  She does have some pigmentation changes of the skin on the right side of the face in the temporal region.  She previously saw dermatology for this and they gave her a cream to start using, however she read some information online about it and stopped using it because she was worried about it damaging her skin.  Blood pressure is elevated x2 today.  She was previously on medication in  and went off the medication and has been monitoring her blood pressures at home.  Over the last few months, she states her blood pressure has mainly been around 140/100.  She thinks may be notes time to go back on the medication again.  She has not noticed any lower extremity swelling, no chest pain or shortness of breath, no headaches or dizziness.  Heartburn is well controlled with Prilosec as needed.  She has been experiencing some stress incontinence issues.  She previously saw a urologist for this and her options were discussed, there was a plan to perform a bladder sling and then she backed out of the surgery because she was too frightened from it.  Menstrual cycles are irregular, last menstrual period in September.  Typically last 5-7 days and are moderate in flow with clots.  She is not symptomatic during her periods.  No history of  or miscarriages, she is not currently on any sort of birth control.  Last Pap was performed in  and negative for ASCUS or HPV.  She is scheduled to have her mammogram today.  She has already  received her influenza vaccination this season, remainder of her vital signs are stable.  She has no other concerns    1. Annual physical exam  Thyroid Stimulating Hormone (TSH)    Basic Metabolic Panel    Hemoglobin    Vitamin D, Total (25-Hydroxy)    Lipid Cascade    CANCELED: Mammo Screening Bilateral   2. Family history of diabetes mellitus  Glycosylated Hemoglobin A1c   3. Essential hypertension, benign  losartan (COZAAR) 25 MG tablet         Healthy Habits:   Regular Exercise: No, discussed  Sunscreen Use: Yes  Healthy Diet: No  Dental Visits Regularly: Yes  Seat Belt: Yes  Sexually active: Yes  Self Breast Exam Monthly:No, discussed  Hemoccults: No  Flex Sig: Yes  Colonoscopy: No  Lipid Profile: Yes  Glucose Screen: Yes  Prevention of Osteoporosis: No  Last Dexa: No  Guns at Home:  No  Domestic Violence:  No    Current Outpatient Medications Include:    Current Outpatient Medications:      cholecalciferol, vitamin D3, (VITAMIN D3) 2,000 unit Tab, Take 1 tablet (2,000 Units total) by mouth daily., Disp: 1 tablet, Rfl: 0     levothyroxine (SYNTHROID, LEVOTHROID) 112 MCG tablet, Take 1 tablet (112 mcg total) by mouth Daily at 6:00 am., Disp: 90 tablet, Rfl: 2     omeprazole (PRILOSEC) 20 MG capsule, Take 1 capsule (20 mg total) by mouth daily before breakfast., Disp: 30 capsule, Rfl: 2     losartan (COZAAR) 25 MG tablet, Take 1 tablet (25 mg total) by mouth daily., Disp: 30 tablet, Rfl: 0    Allergies:  No Known Allergies    Past Medical History:   Diagnosis Date     Diverticulitis large intestine      Gall bladder disease      Previous  section      S/P laparoscopic-assisted sigmoidectomy        Past Surgical History:   Procedure Laterality Date     BILATERAL TEMPOROMANDIBULAR JOINT ARTHROPLASTY       [place]  DELIVERY ONLY      Description:  Section;  Recorded: 2014;     FL LAP,CHOLECYSTECTOMY      Description: Cholecystectomy Laparoscopic;  Recorded: 2014;     [place] PART REMOVAL  COLON W ANASTOMOSIS      Description: Partial Colectomy - Sigmoid;  Proc Date: 03/01/2013;       OB History   No data available       Immunization History   Administered Date(s) Administered     DT (pediatric) 09/01/1998     Hep A, Adult IM (19yr & older) 09/18/2017, 03/19/2018     Influenza, inj, historic,unspecified 09/16/2013, 09/11/2016, 10/15/2018     Influenza,seasonal quad, PF, 36+MOS 10/08/2014     Influenza,seasonal, Inj IIV3 10/06/2005, 09/19/2009, 09/16/2010, 09/22/2011     Td,adult,historic,unspecified 09/01/1998     Tdap 10/24/2011       Family History   Problem Relation Age of Onset     Diabetes type II Mother      Cancer Maternal Grandmother      Diabetes type II Paternal Grandfather        Social History     Socioeconomic History     Marital status:      Spouse name: Estiven     Number of children: 1     Years of education: 14     Highest education level: Not on file   Social Needs     Financial resource strain: Not on file     Food insecurity - worry: Not on file     Food insecurity - inability: Not on file     Transportation needs - medical: Not on file     Transportation needs - non-medical: Not on file   Occupational History     Occupation: purchasing   Tobacco Use     Smoking status: Never Smoker     Smokeless tobacco: Never Used   Substance and Sexual Activity     Alcohol use: No     Drug use: No     Sexual activity: Yes     Partners: Male     Birth control/protection: Condom   Other Topics Concern     Not on file   Social History Narrative     Not on file       Last cholesterol:   Lab Results   Component Value Date    CHOL 172 09/18/2017    CHOL 169 09/08/2016    CHOL 167 06/30/2015     Lab Results   Component Value Date    HDL 44 (L) 09/18/2017    HDL 42 (L) 09/08/2016    HDL 46 06/30/2015     Lab Results   Component Value Date    LDLCALC 98 09/18/2017    LDLCALC 93 09/08/2016    LDLCALC 94 06/30/2015     Lab Results   Component Value Date    TRIG 150 (H) 09/18/2017    TRIG 172 (H)  "09/08/2016    TRIG 136 06/30/2015     No components found for: CHOLHDL    MAMMO: Scheduled for today    Birth Control Method:   High Risk/Behavior:       LMP: No LMP recorded.  Menstrual Regularity: irregular, last September  Flow: Moderate, lasts 5 days      Review of Systems:   General:  Denies fever, chills, HA, fatigue, myalgias, weight change    Eyes: Denies vision changes   Ears/Nose/Throat: Denies nasal congestion, rhinorrhea, ear pain or discharge, sore throat, swollen glands  Cardiovascular: Denies CP, palpitations  Respiratory:  Denies SOB, cough  Gastrointestinal:  Denies changes in bowel habits, melena, rectal bleeding,  Genitourinary: Denies changes in frequency/dysuria, hematuria   Musculoskeletal:  Denies  swelling or erythema, edema  Skin: Denies rashes   Neurologic: Denies weakness, paresthesia  Psychiatric: Denies mood changes   Endocrine: Denies polyuria, polydipsia, polyphagia  Heme/Lymphatic: Denies problem with bleeding   Allergic/Immunologic: Denies problem     POSITIVES: GERD, neck pain intermittent, hemorrhoids, skin discoloration, stress incontinence      PHYSICAL EXAM:    BP (!) 140/102 (Patient Site: Right Arm)   Pulse 98   Temp 98.6  F (37  C)   Resp 16   Ht 5' 8\" (1.727 m)   Wt (!) 222 lb 5 oz (100.8 kg)   SpO2 97%   Breastfeeding? No   BMI 33.80 kg/m      Wt Readings from Last 3 Encounters:   12/07/18 (!) 222 lb 5 oz (100.8 kg)   09/18/17 (!) 224 lb 7 oz (101.8 kg)   09/08/16 218 lb 11.2 oz (99.2 kg)       Body mass index is 33.8 kg/m .    Well developed, well nourished, no acute distress.  HEENT: normocephalic/atraumatic  EYES:PERRLA/EOMI, no redness, swelling or drainage  EARS: TMs: Gray, normal light reflex   NOSE:  no nasal discharge.    MOUTH: Oral mucosa: no erythema/exudate  Neck: No LAD/masses/thyromegaly/bruits  Lungs: clear bilaterally  Heart: regular rate and rhythm, no murmurs/gallops/rubs  Breasts: symmetric, no masses/skin changes, nipple discharge, or axillary " LAD.  BSE reviewed.  Abdomen: Normal bowel sounds, soft, non-tender, non-distended, no masses, neg De La Cruz's/McBurney's, no rebound/guarding  Genital: Normal external genitalia, no discharge, no lesions.  Internal exam is negative for abnormal bleeding, discharge or masses.  Rectal: internal exam is deferred.  External exam is normal.  Small flap of skin hanging over the rectum from the vaginal area.  Skin is not inflamed, no lesion formation, no ulcerations  Lymphatics: no supraclavicular/axillary/epitrochlear/inguinal LAD. No edema.  Neuro: A&O x 3, CN II-XII intact, strength 5/5, reflexes symmetric, sensory intact to light touch.  Psych: Behavior appropriate, engaging.  Thought processes congruent, non-tangential.  Musculoskeletal: no gross deformities.  Skin: no rashes or lesions.  Patchy discoloration on the right face of temple area, appears bruised like, flat      No results found for this or any previous visit (from the past 240 hour(s)).    ASSESSMENT/PLAN: 48 y.o. female physical exam and pelvic exam      The following high BMI interventions were performed this visit: encouragement to exercise and weight monitoring    1. Annual physical exam    - Thyroid Stimulating Hormone (TSH)  - Basic Metabolic Panel  - Hemoglobin  - Vitamin D, Total (25-Hydroxy)  - Lipid Cascade      2. Family history of diabetes mellitus    - Glycosylated Hemoglobin A1c    3. Essential hypertension, benign    - losartan (COZAAR) 25 MG tablet; Take 1 tablet (25 mg total) by mouth daily.  Dispense: 30 tablet; Refill: 0      Medications Discontinued During This Encounter   Medication Reason     VITAMIN D2 50,000 unit capsule Side effects       Routine health maintenance discussion:  No smoking, limited alcohol (7 or less servings per week), 5 fruits/veg servings per day, 200 minutes of exercise per week.  Daily calcium/vitamin D guidelines, bone health, yearly mammogram after age 39/regular pap smears/colon cancer screening beginning at  age 50.  Accident avoidance, sun screen.      Will contact her with the results of the labs when available.    Return in 2 weeks for BP check, initiated on medication today.    Elizabeth Sandhu , CNP

## 2021-06-25 ENCOUNTER — OFFICE VISIT - HEALTHEAST (OUTPATIENT)
Dept: FAMILY MEDICINE | Facility: CLINIC | Age: 51
End: 2021-06-25

## 2021-06-25 DIAGNOSIS — B37.31 YEAST INFECTION OF THE VAGINA: ICD-10-CM

## 2021-06-25 DIAGNOSIS — L72.3 INFLAMED SEBACEOUS CYST: ICD-10-CM

## 2021-06-25 ASSESSMENT — MIFFLIN-ST. JEOR: SCORE: 1729.46

## 2021-07-03 NOTE — ADDENDUM NOTE
Addendum Note by Kayleen Boyer MD at 9/18/2017 12:41 PM     Author: Kayleen Boyer MD Service: -- Author Type: Physician    Filed: 9/18/2017 12:41 PM Encounter Date: 9/18/2017 Status: Signed    : Kayleen Boyer MD (Physician)    Addended by: KAYLEEN BOYER on: 9/18/2017 12:41 PM        Modules accepted: Orders

## 2021-07-03 NOTE — ADDENDUM NOTE
Addendum Note by Cassie Bell MD at 3/17/2018 12:28 PM     Author: Cassie Bell MD Service: -- Author Type: Physician    Filed: 3/17/2018 12:28 PM Encounter Date: 3/15/2018 Status: Signed    : Cassie Bell MD (Physician)    Addended by: CASSIE BELL on: 3/17/2018 12:28 PM        Modules accepted: Orders

## 2021-07-05 PROBLEM — E66.01 MORBID OBESITY (H): Status: ACTIVE | Noted: 2021-06-25

## 2021-07-06 VITALS
HEIGHT: 67 IN | BODY MASS INDEX: 37.43 KG/M2 | HEART RATE: 109 BPM | DIASTOLIC BLOOD PRESSURE: 82 MMHG | SYSTOLIC BLOOD PRESSURE: 138 MMHG | OXYGEN SATURATION: 99 % | WEIGHT: 238.5 LBS

## 2021-07-07 NOTE — PATIENT INSTRUCTIONS - HE
The area is MUCH flatter. Hopefully you'll have more comfort too.    3-4 advil every 6-8 hours for pain.    Keep dressing on. Change out as it saturates or at least every couple of days until it closes up.    No soaking until healed over.    If this comes back, we may need to get general surgery or derm involved.  Just let me know.     Given the drainage, I did send some keflex antibiotic as well. Take until gone.

## 2021-07-07 NOTE — PROGRESS NOTES
"Chief Complaint   Patient presents with     Mass     Bump on back about quarter sized, under bra strap, painful, been there for over a month, started to get bad on saturday.        HPI: Patient presents today with concerns about a lump along her left middle back.  Present for about a month.  Painful and red for the last week.  No systemic ill symptoms.  No drainage.  Atraumatic.  No oral lesions or other skin issues.    ROS:Review of Systems - negative except for what's listed in the HPI    SH: The Patient's  reports that she has never smoked. She has never used smokeless tobacco. She reports that she does not drink alcohol or use drugs.      FH: The Patient's family history includes Cancer in her maternal grandmother; Diabetes type II in her mother and paternal grandfather.     Meds:    Current Outpatient Medications on File Prior to Visit   Medication Sig Dispense Refill     levothyroxine (SYNTHROID, LEVOTHROID) 112 MCG tablet TAKE ONE TABLET BY MOUTH EVERY DAY AT 6:00AM 90 tablet 3     losartan (COZAAR) 50 MG tablet TAKE ONE TABLET BY MOUTH EVERY DAY 90 tablet 3     omeprazole (PRILOSEC) 20 MG capsule Take 1 capsule (20 mg total) by mouth daily before breakfast. 30 capsule 2     cholecalciferol, vitamin D3, (VITAMIN D3) 2,000 unit Tab Take 1 tablet (2,000 Units total) by mouth daily. 1 tablet 0     No current facility-administered medications on file prior to visit.        O:  /82   Pulse (!) 109   Ht 5' 7\" (1.702 m)   Wt (!) 238 lb 8 oz (108.2 kg)   SpO2 99%   Breastfeeding No   BMI 37.35 kg/m      Physical Examination:   General appearance - alert, well appearing, and in no distress  Mental status - alert, oriented to person, place, and time  Lymphatics - no palpable lymphadenopathy  Skin -large inflamed lump measuring approximately 2 cm in diameter.  Tender to touch.  Hot to touch.  Fluctuance palpated.    A/P:     Problem List Items Addressed This Visit     None      Visit Diagnoses     Inflamed " sebaceous cyst    -  Primary            1. Inflamed sebaceous cyst  Appears to be an inflamed sebaceous cyst.  Discussed different treatment options including incision and drainage versus hot packing.  Patient would prefer incision and drainage today.  Potential risks discussed with the patient including pain, infection, and lack of drainage.  We also discussed potential risk of damage to underlying structures.  All questions answered.  After verbal consent was obtained, the area was cleansed with iodine swabs x3 and then alcohol.  Anesthesia was obtained with lidocaine/epinephrine approximately 2 mL.  After anesthesia was firm, the area was opened with a stab incision using #11 scalpel.  Large amounts of yellow drainage was obtained and large amounts of cystic contents were removed with firm steady pressure.  The wound was then explored with sterile blunt clamps to break up any loculations.  Area is considerably flatter.  Sterile gauze applied.  Change out every couple days or if soaking through.  Not large enough to require packing today.  Keep covered.  Given purulence of drainage, cephalexin sent to the pharmacy as well.  She oftentimes will get a yeast infection and so I covered this also with fluconazole.    2.  Yeast infection    Mario Corea, CNP      This note has been dictated using voice recognition software. Any grammatical or context distortions are unintentional and inherent to the software.

## 2021-07-13 ENCOUNTER — RECORDS - HEALTHEAST (OUTPATIENT)
Dept: ADMINISTRATIVE | Facility: CLINIC | Age: 51
End: 2021-07-13

## 2021-07-21 ENCOUNTER — RECORDS - HEALTHEAST (OUTPATIENT)
Dept: ADMINISTRATIVE | Facility: CLINIC | Age: 51
End: 2021-07-21

## 2021-12-29 DIAGNOSIS — E03.9 HYPOTHYROIDISM: ICD-10-CM

## 2021-12-29 DIAGNOSIS — I10 ESSENTIAL HYPERTENSION, BENIGN: ICD-10-CM

## 2021-12-31 RX ORDER — LEVOTHYROXINE SODIUM 112 UG/1
TABLET ORAL
Qty: 90 TABLET | Refills: 0 | Status: SHIPPED | OUTPATIENT
Start: 2021-12-31 | End: 2022-03-29

## 2021-12-31 RX ORDER — LOSARTAN POTASSIUM 50 MG/1
TABLET ORAL
Qty: 90 TABLET | Refills: 0 | Status: SHIPPED | OUTPATIENT
Start: 2021-12-31 | End: 2022-03-29

## 2021-12-31 NOTE — TELEPHONE ENCOUNTER
"Routing refill request to provider for review/approval because:  Labs not current:  Multiple    Last Written Prescription Date:  10/9/20  Last Fill Quantity: 90,  # refills: 3   Last office visit provider:  6/25/21     Requested Prescriptions   Pending Prescriptions Disp Refills     levothyroxine (SYNTHROID/LEVOTHROID) 112 MCG tablet [Pharmacy Med Name: LEVOTHYROXINE SOD 112MCG TAB] 90 tablet 3     Sig: TAKE ONE TABLET BY MOUTH EVERY DAY AT 6AM       Thyroid Protocol Failed - 12/29/2021  4:15 AM        Failed - Normal TSH on file in past 12 months     Recent Labs   Lab Test 09/25/20  0741   TSH 2.02              Passed - Patient is 12 years or older        Passed - Recent (12 mo) or future (30 days) visit within the authorizing provider's specialty     Patient has had an office visit with the authorizing provider or a provider within the authorizing providers department within the previous 12 mos or has a future within next 30 days. See \"Patient Info\" tab in inbasket, or \"Choose Columns\" in Meds & Orders section of the refill encounter.              Passed - Medication is active on med list        Passed - No active pregnancy on record     If patient is pregnant or has had a positive pregnancy test, please check TSH.          Passed - No positive pregnancy test in past 12 months     If patient is pregnant or has had a positive pregnancy test, please check TSH.             losartan (COZAAR) 50 MG tablet [Pharmacy Med Name: LOSARTAN POTASSIUM 50MG TABS] 90 tablet 3     Sig: TAKE ONE TABLET BY MOUTH EVERY DAY       Angiotensin-II Receptors Failed - 12/29/2021  4:15 AM        Failed - Normal serum creatinine on file in past 12 months     Recent Labs   Lab Test 09/25/20  0741   CR 0.81       Ok to refill medication if creatinine is low          Failed - Normal serum potassium on file in past 12 months     Recent Labs   Lab Test 09/25/20  0741   POTASSIUM 4.2                    Passed - Last blood pressure under 140/90 in " "past 12 months     BP Readings from Last 3 Encounters:   06/25/21 138/82   09/25/20 132/86                 Passed - Recent (12 mo) or future (30 days) visit within the authorizing provider's specialty     Patient has had an office visit with the authorizing provider or a provider within the authorizing providers department within the previous 12 mos or has a future within next 30 days. See \"Patient Info\" tab in inbasket, or \"Choose Columns\" in Meds & Orders section of the refill encounter.              Passed - Medication is active on med list        Passed - Patient is age 18 or older        Passed - No active pregnancy on record        Passed - No positive pregnancy test in past 12 months             Luis Antonio Novoa RN 12/31/21 7:11 AM  "

## 2021-12-31 NOTE — TELEPHONE ENCOUNTER
Patient needs preventative health care visit within the next 3 months with med check and labs.  Thanks,    Chester Ravi MD

## 2022-03-26 DIAGNOSIS — I10 ESSENTIAL HYPERTENSION, BENIGN: ICD-10-CM

## 2022-03-26 DIAGNOSIS — E03.9 HYPOTHYROIDISM: ICD-10-CM

## 2022-03-28 NOTE — TELEPHONE ENCOUNTER
"Routing refill request to provider for review/approval because:  Labs not current:  Serum creatinine, potassium, TSH    Last Written Prescription Date:  12/31/2021-both meds  Last Fill Quantity: 90,  # refills: 0   Last office visit provider:  06/25/2021 with Mario Corea NP    Requested Prescriptions   Pending Prescriptions Disp Refills     losartan (COZAAR) 50 MG tablet [Pharmacy Med Name: LOSARTAN POTASSIUM 50MG TABS] 90 tablet 0     Sig: TAKE ONE TABLET BY MOUTH EVERY DAY       Angiotensin-II Receptors Failed - 3/26/2022  4:29 AM        Failed - Normal serum creatinine on file in past 12 months     Recent Labs   Lab Test 09/25/20  0741   CR 0.81       Ok to refill medication if creatinine is low          Failed - Normal serum potassium on file in past 12 months     Recent Labs   Lab Test 09/25/20  0741   POTASSIUM 4.2                    Passed - Last blood pressure under 140/90 in past 12 months     BP Readings from Last 3 Encounters:   06/25/21 138/82   09/25/20 132/86                 Passed - Recent (12 mo) or future (30 days) visit within the authorizing provider's specialty     Patient has had an office visit with the authorizing provider or a provider within the authorizing providers department within the previous 12 mos or has a future within next 30 days. See \"Patient Info\" tab in inbasket, or \"Choose Columns\" in Meds & Orders section of the refill encounter.              Passed - Medication is active on med list        Passed - Patient is age 18 or older        Passed - No active pregnancy on record        Passed - No positive pregnancy test in past 12 months           levothyroxine (SYNTHROID/LEVOTHROID) 112 MCG tablet [Pharmacy Med Name: LEVOTHYROXINE SOD 112MCG TAB] 90 tablet 0     Sig: TAKE ONE TABLET BY MOUTH EVERY MORNING AT 6AM       Thyroid Protocol Failed - 3/26/2022  4:29 AM        Failed - Normal TSH on file in past 12 months     Recent Labs   Lab Test 09/25/20  0741   TSH 2.02              " "Passed - Patient is 12 years or older        Passed - Recent (12 mo) or future (30 days) visit within the authorizing provider's specialty     Patient has had an office visit with the authorizing provider or a provider within the authorizing providers department within the previous 12 mos or has a future within next 30 days. See \"Patient Info\" tab in inbasket, or \"Choose Columns\" in Meds & Orders section of the refill encounter.              Passed - Medication is active on med list        Passed - No active pregnancy on record     If patient is pregnant or has had a positive pregnancy test, please check TSH.          Passed - No positive pregnancy test in past 12 months     If patient is pregnant or has had a positive pregnancy test, please check TSH.               Breanna Panda 03/28/22 6:29 PM  "

## 2022-03-29 RX ORDER — LEVOTHYROXINE SODIUM 112 UG/1
TABLET ORAL
Qty: 90 TABLET | Refills: 0 | Status: SHIPPED | OUTPATIENT
Start: 2022-03-29

## 2022-03-29 RX ORDER — LOSARTAN POTASSIUM 50 MG/1
TABLET ORAL
Qty: 90 TABLET | Refills: 0 | Status: SHIPPED | OUTPATIENT
Start: 2022-03-29

## 2022-03-29 NOTE — TELEPHONE ENCOUNTER
Last refill that will be given without being seen.  She is due for laboratory work as well.  Please help her get an appointment and I would suggest having received one of her newer providers.  Thank you,  Dr. Ravi